# Patient Record
(demographics unavailable — no encounter records)

---

## 2025-05-14 NOTE — PHYSICAL EXAM
[TextEntry] : General: AAO, no significant distress Psychiatric: affect pleasant and within normal limits Eyes: relatively symmetric, no obvious nystagmus Skin: no significant lesions on face Nose: no significant lesions; patent. Oral Cavity & Oropharynx: no significant deformity or lesions Neck/Lymphatics: no significant masses or abnormal cervical nodes palpated Respiratory: breathing comfortably; no significant distress Neurologic: cranial nerves II-XII grossly intact; EOMI Facial function: symmetric   Ear examination performed under binocular otologic microscope: Left Ear External: Pinna and periauricular area is normal. Canal: Ear canal skin is not inflamed or edematous. Left cerumen impaction cleaned under microscopy with instrumentation. Swollen ear canal skin. Tympanic Membrane: Intact and in good position.   Right Ear External: Pinna and periauricular area is normal. Canal: Ear canal skin is not inflamed or edematous. Right cerumen impaction cleaned under microscopy with instrumentation. Swollen ear canal skin. Tympanic Membrane: Intact and in good position.  Procedure: Left cerumen removal Pre-operative Diagnosis: Left cerumen impaction Post-operative Diagnosis: Left cerumen impaction Anesthesia: None Procedure Details: The patient was placed in the supine position. The left ear canal was determined to be impacted with cerumen. A curette and/or suction was used to remove the cerumen impaction under microscopy. Condition: Stable. Patient tolerated procedure well. Complications: None.   Procedure: Right cerumen removal Pre-operative Diagnosis: Right cerumen impaction Post-operative Diagnosis: Right cerumen impaction Anesthesia: None Procedure Details: The patient was placed in the supine position. The right ear canal was determined to be impacted with cerumen. A curette and/or suction was used to remove the cerumen impaction under microscopy. Condition: Stable. Patient tolerated procedure well. Complications: None.

## 2025-05-14 NOTE — ASSESSMENT
[FreeTextEntry1] : Bilateral acute otitis externa - 1 acute illness with systemic symptoms (fatigue, insomnia) - ciprodex - 4 drops to both ears BID for 1 week - ciprofloxacin PO - 500mg BID for 1 week - The potential side effects of ciprofloxacin were discussed at length with the patient which include but are not limited to: digestive issues (nausea, vomiting, diarrhea, bloating, indigestion, abdominal pain, loss of appetite), allergic reaction (hives, wheezing, tightness in throat, anaphylaxis), photosensitivity (skin discoloration, inflammation, itching), dizziness rash, yeast infection, headaches - Wear sunscreen and reduce sun exposure while taking an antibiotic - A rare but serious side effect is tenderness in a joint or behind the ankle (Achilles) which can result in tendon rupture. Stop the medication, cease vigorous physical activity (2-6 weeks), and call the office immediately - f/u in 1 week  Bilateral Cerumen Impaction - 1 chronic illness - Bilateral cerumen impaction removed under microscopy with instrumentation

## 2025-05-14 NOTE — DATA REVIEWED
[de-identified] : Audiogram personally reviewed and interpreted and my findings are as follows (9/20/24): Right: SRT 15dB; %; Type A tymp; normal  Left: SRT 20dB; %; Type A tymp; normal with mild 4khz noise induced notch

## 2025-05-14 NOTE — HISTORY OF PRESENT ILLNESS
[de-identified] : 49M who presents with bilateral hearing loss for the past few weeks. Pt was swimming and felt his ears get clogged. He went to two urgent cares which attempted cerumen cleaning which were not successful. He feels like his hearing is at 5%. No otalgia, otorrhea, vertigo, tinnitus.  5/14/25: Patient presents with severe acute bilateral ear pain for the past 3 days. He recently had a sinus infection that has cleared. He cannot sleep and has fatigue. Pain meds have not been working for him. He has had drainage coming out of the ears.

## 2025-05-19 NOTE — PHYSICAL EXAM
[TextEntry] : General: AAO, no significant distress Psychiatric: affect pleasant and within normal limits Eyes: relatively symmetric, no obvious nystagmus Skin: no significant lesions on face Nose: no significant lesions; patent. Oral Cavity & Oropharynx: no significant deformity or lesions Neck/Lymphatics: no significant masses or abnormal cervical nodes palpated Respiratory: breathing comfortably; no significant distress Neurologic: cranial nerves II-XII grossly intact; EOMI Facial function: symmetric   Ear examination performed under binocular otologic microscope: Left Ear External: Pinna and periauricular area is normal. Canal: Ear canal skin is not inflamed or edematous. Swollen ear canal skin. Tympanic Membrane: Intact and in good position. Difficult to evaluate middle ear effusion.    Right Ear External: Pinna and periauricular area is normal. Canal: Ear canal skin is not inflamed or edematous. Swollen ear canal skin. Tympanic Membrane: Intact and in good position. Difficult to evaluate middle ear effusion.  Nasal Endoscopy:   Pre-operative Diagnosis: Left maxillary sinusitis Post-operative Diagnosis: same Anesthesia: None Procedure: Bilateral nasal endoscopy Procedure Details:   The patient was placed in the seated position sitting straight up. The telescope was passed along the left nasal floor to the nasopharynx. It was then passed into the region of the middle meatus, middle turbinate, and the sphenoethmoid region. An identical procedure was performed on the right side.   Findings: Interior nasal cavity: normal bilaterally Middle and superior meatus: normal bilaterally Sphenoethmoidal recess: normal bilaterally Mucosa: normal bilaterally Nasal septum: normal Discharge: from left maxillary os Turbinates: normal bilaterally Adenoid: normal bilaterally Posterior choanae: normal bilaterally Eustachian tubes: normal bilaterally Mucous stranding: normal bilaterally Lesions: Not present   Comments: PUS EMANATING FROM LEFT MAXILLARY OS INTO CHOANA   Condition: Stable. Patient tolerated procedure well.

## 2025-05-19 NOTE — HISTORY OF PRESENT ILLNESS
[de-identified] : 49M who presents with bilateral hearing loss for the past few weeks. Pt was swimming and felt his ears get clogged. He went to two urgent cares which attempted cerumen cleaning which were not successful. He feels like his hearing is at 5%. No otalgia, otorrhea, vertigo, tinnitus.  5/14/25: Patient presents with severe acute bilateral ear pain for the past 3 days. He recently had a sinus infection that has cleared. He cannot sleep and has fatigue. Pain meds have not been working for him. He has had drainage coming out of the ears.   5/19/25: Patient still with bilateral R>L ear pain and pressure despite taking oral ciprofloxacin and ciprodex gtt for the past 5 days. His hearing is decreased on both sides. He is still in a lot of pain and has fatigue, myalgias, headaches, nausea, vomiting, and dizziness. He got the flu from his daughter.

## 2025-05-19 NOTE — DATA REVIEWED
[de-identified] : Audiogram personally reviewed and interpreted and my findings are as follows (5/19/25): Right: SRT 45dB; WRS 90%; Type B tymp; mod down to mod-severe MHL Left: SRT 45dB; WRS 90%; Type B tymp; mild down to mod-severe MHL  Audiogram personally reviewed and interpreted and my findings are as follows (9/20/24): Right: SRT 15dB; %; Type A tymp; normal  Left: SRT 20dB; %; Type A tymp; normal with mild 4khz noise induced notch

## 2025-05-19 NOTE — ASSESSMENT
[FreeTextEntry1] : L maxillary sinusitis - 1 acute illness with systemic symptoms - s/p ciprofloxacin PO - augmentin BID for 1 week - 11 day high dose prednisone course - percocet 5-325mg as needed every 4 hours for extreme pain not covered with ibuprofen and acetaminophen - The potential side effects of augmentin were discussed at length with the patient which include but are not limited to: digestive issues (nausea, vomiting, diarrhea, bloating, indigestion, abdominal pain, loss of appetite), allergic reaction (hives, wheezing, tightness in throat, anaphylaxis), photosensitivity (skin discoloration, inflammation, itching), dizziness rash, yeast infection, headaches - Wear sunscreen and reduce sun exposure while taking an antibiotic - A rare but serious side effect is tenderness in a joint or behind the ankle (Achilles) which can result in tendon rupture. Stop the medication, cease vigorous physical activity (2-6 weeks), and call the office immediately - The potential side effects of corticosteroid use were discussed at length with the patient which include but are not limited to: increased appetite, insomnia, fluid retention, mood swings, weight gain, change in blood pressure, increased blood glucose levels, osteoporosis, avascular necrosis of the hip, menstrual irregularities (if applicable), stomach ulcers/bleeding, glaucoma, and cataracts. The patient understands these risks and is willing to proceed with oral steroid therapy. - Take the steroid first thing in the morning with a meal - If you begin to have pain in your hip, you may have avascular necrosis of the hip which is a serious side-effect. Notify the office immediately and cease vigorous physical activity  Bilateral acute otitis externa - 1 acute illness with systemic symptoms (fatigue, insomnia) - s/p ciprodex - 4 drops to both ears BID for 1 week - s/p ciprofloxacin PO - 500mg BID for 1 week - still with ear canal swelling - clotrimazole drops to both ears - 4 drops to each ear twice a day for 1 week  Bilateral Mixed Hearing loss - audiogram reviewed with patient  - f/u in 1 week if not improved

## 2025-05-23 NOTE — DATA REVIEWED
[de-identified] : Audiogram personally reviewed and interpreted and my findings are as follows (5/19/25): Right: SRT 45dB; WRS 90%; Type B tymp; mod down to mod-severe MHL Left: SRT 45dB; WRS 90%; Type B tymp; mild down to mod-severe MHL  Audiogram personally reviewed and interpreted and my findings are as follows (9/20/24): Right: SRT 15dB; %; Type A tymp; normal  Left: SRT 20dB; %; Type A tymp; normal with mild 4khz noise induced notch

## 2025-05-23 NOTE — ASSESSMENT
[FreeTextEntry1] : L maxillary sinusitis - 1 acute illness with systemic symptoms - s/p ciprofloxacin PO - augmentin BID for 1 week - 11 day high dose prednisone course - percocet 5-325mg as needed every 4 hours for extreme pain not covered with ibuprofen and acetaminophen - The potential side effects of augmentin were discussed at length with the patient which include but are not limited to: digestive issues (nausea, vomiting, diarrhea, bloating, indigestion, abdominal pain, loss of appetite), allergic reaction (hives, wheezing, tightness in throat, anaphylaxis), photosensitivity (skin discoloration, inflammation, itching), dizziness rash, yeast infection, headaches - Wear sunscreen and reduce sun exposure while taking an antibiotic - A rare but serious side effect is tenderness in a joint or behind the ankle (Achilles) which can result in tendon rupture. Stop the medication, cease vigorous physical activity (2-6 weeks), and call the office immediately - The potential side effects of corticosteroid use were discussed at length with the patient which include but are not limited to: increased appetite, insomnia, fluid retention, mood swings, weight gain, change in blood pressure, increased blood glucose levels, osteoporosis, avascular necrosis of the hip, menstrual irregularities (if applicable), stomach ulcers/bleeding, glaucoma, and cataracts. The patient understands these risks and is willing to proceed with oral steroid therapy. - Take the steroid first thing in the morning with a meal - If you begin to have pain in your hip, you may have avascular necrosis of the hip which is a serious side-effect. Notify the office immediately and cease vigorous physical activity - drastically improved per patient report  Bilateral acute otitis externa - 1 acute illness with systemic symptoms (fatigue, insomnia) - s/p ciprodex - 4 drops to both ears BID for 1 week - s/p ciprofloxacin PO - 500mg BID for 1 week - still with ear canal swelling - clotrimazole drops to both ears - 4 drops to each ear twice a day for 1 week - drastically improved per patient report  Bilateral Mixed Hearing loss - audiogram reviewed with patient - patient still with bilateral hearing loss - I offered him bilateral myringotomy with tympanostomy tubes to drain the fluid but he would rather continue with conservative management - I advised that it can take up to 3 months for serous effusions to resolve - patient understands and will continue conservative management for now - f/u as needed  - f/u in 1 week if not improved

## 2025-05-23 NOTE — PHYSICAL EXAM
[TextEntry] : Deferred due to telehealth  Previous exam: General: AAO, no significant distress Psychiatric: affect pleasant and within normal limits Eyes: relatively symmetric, no obvious nystagmus Skin: no significant lesions on face Nose: no significant lesions; patent. Oral Cavity & Oropharynx: no significant deformity or lesions Neck/Lymphatics: no significant masses or abnormal cervical nodes palpated Respiratory: breathing comfortably; no significant distress Neurologic: cranial nerves II-XII grossly intact; EOMI Facial function: symmetric   Ear examination performed under binocular otologic microscope: Left Ear External: Pinna and periauricular area is normal. Canal: Ear canal skin is not inflamed or edematous. Swollen ear canal skin. Tympanic Membrane: Intact and in good position. Difficult to evaluate middle ear effusion.    Right Ear External: Pinna and periauricular area is normal. Canal: Ear canal skin is not inflamed or edematous. Swollen ear canal skin. Tympanic Membrane: Intact and in good position. Difficult to evaluate middle ear effusion.  Nasal Endoscopy:   Pre-operative Diagnosis: Left maxillary sinusitis Post-operative Diagnosis: same Anesthesia: None Procedure: Bilateral nasal endoscopy Procedure Details:   The patient was placed in the seated position sitting straight up. The telescope was passed along the left nasal floor to the nasopharynx. It was then passed into the region of the middle meatus, middle turbinate, and the sphenoethmoid region. An identical procedure was performed on the right side.   Findings: Interior nasal cavity: normal bilaterally Middle and superior meatus: normal bilaterally Sphenoethmoidal recess: normal bilaterally Mucosa: normal bilaterally Nasal septum: normal Discharge: from left maxillary os Turbinates: normal bilaterally Adenoid: normal bilaterally Posterior choanae: normal bilaterally Eustachian tubes: normal bilaterally Mucous stranding: normal bilaterally Lesions: Not present   Comments: PUS EMANATING FROM LEFT MAXILLARY OS INTO CHOANA   Condition: Stable. Patient tolerated procedure well.

## 2025-05-23 NOTE — HISTORY OF PRESENT ILLNESS
[de-identified] : 49M who presents with bilateral hearing loss for the past few weeks. Pt was swimming and felt his ears get clogged. He went to two urgent cares which attempted cerumen cleaning which were not successful. He feels like his hearing is at 5%. No otalgia, otorrhea, vertigo, tinnitus.  5/14/25: Patient presents with severe acute bilateral ear pain for the past 3 days. He recently had a sinus infection that has cleared. He cannot sleep and has fatigue. Pain meds have not been working for him. He has had drainage coming out of the ears.   5/19/25: Patient still with bilateral R>L ear pain and pressure despite taking oral ciprofloxacin and ciprodex gtt for the past 5 days. His hearing is decreased on both sides. He is still in a lot of pain and has fatigue, myalgias, headaches, nausea, vomiting, and dizziness. He got the flu from his daughter.   The provider, KIMBERLY WARREN, was located at the medical office located at [address] [place] [address] at the time of the visit. The patient, Malina Gutiérrez, was located in their home. The patient and Provider participated in the telephonic visit (audio only) which the patient consented to. 5/23/25 (telehealth): Chief complaint - Bilateral mixed hearing loss. Patient states that his nose is very open and clear and he has minimal nasal symptoms at this point. The pressure and pain in his ears has decreased dramatically. However, he is still having hearing loss on both sides.

## 2025-05-24 NOTE — DATA REVIEWED
[de-identified] : Audiogram personally reviewed and interpreted and my findings are as follows (5/19/25): Right: SRT 45dB; WRS 90%; Type B tymp; mod down to mod-severe MHL Left: SRT 45dB; WRS 90%; Type B tymp; mild down to mod-severe MHL  Audiogram personally reviewed and interpreted and my findings are as follows (9/20/24): Right: SRT 15dB; %; Type A tymp; normal  Left: SRT 20dB; %; Type A tymp; normal with mild 4khz noise induced notch

## 2025-05-24 NOTE — HISTORY OF PRESENT ILLNESS
[de-identified] : 49M who presents with bilateral hearing loss for the past few weeks. Pt was swimming and felt his ears get clogged. He went to two urgent cares which attempted cerumen cleaning which were not successful. He feels like his hearing is at 5%. No otalgia, otorrhea, vertigo, tinnitus.  5/14/25: Patient presents with severe acute bilateral ear pain for the past 3 days. He recently had a sinus infection that has cleared. He cannot sleep and has fatigue. Pain meds have not been working for him. He has had drainage coming out of the ears.   5/19/25: Patient still with bilateral R>L ear pain and pressure despite taking oral ciprofloxacin and ciprodex gtt for the past 5 days. His hearing is decreased on both sides. He is still in a lot of pain and has fatigue, myalgias, headaches, nausea, vomiting, and dizziness. He got the flu from his daughter.   The provider, KIMBERLY WARREN, was located at the medical office located at [address] [place] [address] at the time of the visit. The patient, Malina Gutiérrez, was located in their home. The patient and Provider participated in the telephonic visit (audio only) which the patient consented to. 5/23/25 (telehealth): Chief complaint - Bilateral mixed hearing loss. Patient states that his nose is very open and clear and he has minimal nasal symptoms at this point. The pressure and pain in his ears has decreased dramatically. However, he is still having hearing loss on both sides.